# Patient Record
Sex: MALE | Race: WHITE | Employment: UNEMPLOYED | URBAN - METROPOLITAN AREA
[De-identification: names, ages, dates, MRNs, and addresses within clinical notes are randomized per-mention and may not be internally consistent; named-entity substitution may affect disease eponyms.]

---

## 2019-09-04 ENCOUNTER — TELEPHONE (OUTPATIENT)
Dept: NEUROLOGY | Age: 59
End: 2019-09-04

## 2019-09-11 ENCOUNTER — DOCUMENTATION ONLY (OUTPATIENT)
Dept: NEUROLOGY | Age: 59
End: 2019-09-11

## 2019-09-18 ENCOUNTER — TELEPHONE (OUTPATIENT)
Dept: NEUROLOGY | Age: 59
End: 2019-09-18

## 2019-09-18 NOTE — TELEPHONE ENCOUNTER
Re: Botox      Patient has [facility] to proceed due to Credentialing pending status with Umer Malone

## 2019-09-19 DIAGNOSIS — R25.2 SPASTICITY: Primary | ICD-10-CM

## 2019-09-26 ENCOUNTER — TELEPHONE (OUTPATIENT)
Dept: NEUROLOGY | Age: 59
End: 2019-09-26

## 2019-12-26 DIAGNOSIS — G80.2 SPASTIC HEMIPLEGIC CEREBRAL PALSY (HCC): ICD-10-CM

## 2019-12-26 DIAGNOSIS — G11.4 HEREDITARY SPASTIC PARAPLEGIA (HCC): Primary | ICD-10-CM

## 2019-12-27 ENCOUNTER — TELEPHONE (OUTPATIENT)
Dept: NEUROLOGY | Age: 59
End: 2019-12-27

## 2019-12-27 NOTE — TELEPHONE ENCOUNTER
Botox 300 units - sent to [facility] via [facility]. CPT's will not require a PA. SPP will be [facility]. Status is pending. Key: [id number] - PA Case ID: [id number] help?  Call us at [phone]  Status  Sent to [facility]  Drug  Botox 100UNIT solution  Form  FEP Electronic PA Form (NCPDP)

## 2019-12-27 NOTE — TELEPHONE ENCOUNTER
Botox approval from [address] letter, demo sheet, copy of ins card and Rx to [facility] att: Chay Lee, pharmacist to process and noted on cover sheet appt scheduled for 1/13/20 and would like to have delivered week of 1/6-1/10.

## 2019-12-30 ENCOUNTER — TELEPHONE (OUTPATIENT)
Dept: NEUROLOGY | Age: 59
End: 2019-12-30

## 2019-12-30 ENCOUNTER — DOCUMENTATION ONLY (OUTPATIENT)
Dept: NEUROLOGY | Age: 59
End: 2019-12-30

## 2019-12-30 NOTE — PROGRESS NOTES
Spoke to [facility] they are working botox and will call [facility] to confirm botox delivery.

## 2019-12-30 NOTE — TELEPHONE ENCOUNTER
Ian/pharmacist w/ [facility] called - they will ship out Botox on Mon, 1/6 for Tues delivery 1/7/20. They spoke w/ pt's caregiver and agreed with plan.

## 2020-01-09 ENCOUNTER — DOCUMENTATION ONLY (OUTPATIENT)
Dept: NEUROLOGY | Age: 60
End: 2020-01-09

## 2020-01-09 ENCOUNTER — TELEPHONE (OUTPATIENT)
Dept: NEUROLOGY | Age: 60
End: 2020-01-09

## 2020-01-09 NOTE — PROGRESS NOTES
botox came in the office: 1/7/2020  MFR: allergbrandon  LOT: [id number]  Exp: 5/2022  Appointment:1/13/2020  Specialty pharmacy:[facility]  Provider: Venecia Roman

## 2020-01-09 NOTE — TELEPHONE ENCOUNTER
No documentation if Botox was delivered. Called [facility] [address], pharmacist - was shipped overnight on 1/6 to be delivered on 1/7. Checked Botox log book - logged in by Jose Carlos flores on 1/7. No note in CC. Botox appt is Mon, 1/13/20.

## 2020-01-10 NOTE — PROGRESS NOTES
Neurology Note    Patient ID:  Raj Brandt  [id number]  68 y.o.  1960      Date of Consultation:  January 13, 2020    Referring Physician: Dr. Brigitte Lopez    Reason for Consultation:  Spasticity in legs    Subjective: I am here for botox       History of Present Illness:   Raj Brandt is a 61 y.o. male who was referred to the neurology clinic at [facility] for botulinum toxin injections. 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 He was previously receiving his botulinum toxin injections at [facility]. His injections were last given 3 months ago by Dr. Jose Valle. He continues to receive injections into his right lower extremity which helped significantly in regards to his spasticity, pain, and cramping in that extremity. He had no untoward side effects of the last injection. The only symptoms overall that he has noticed is that there is a slight increase in weakness in his left upper extremity but this is mild. He states that speech and swallowing are stable. Past Medical History:   Diagnosis Date    ALS (amyotrophic lateral sclerosis) ([facility])     right sided weakness    CP (cerebral palsy) ([facility])     cerebellar implants        Past Surgical History:   Procedure Laterality Date    HX HEENT      total teeth extraction/implants    HX ORTHOPAEDIC  2015    repair of fractured right hip    HX OTHER SURGICAL      cellebellar implants        Family History   Problem Relation Age of Onset    Heart Disease Mother     Dementia Father     Hypertension Sister     Hypertension Brother     Cancer Brother         lung    Hypertension Sister     Anesth Problems Neg Hx         Social History     Tobacco Use    Smoking status: Never Smoker    Smokeless tobacco: Never Used   Substance Use Topics    Alcohol use:  Yes     Alcohol/week: 2.0 standard drinks     Types: 2 Shots of liquor per week        No Known Allergies     Prior to Admission medications    Medication Sig Start Date End Date Taking? Authorizing Provider   onabotulinumtoxinA (BOTOX) 100 unit injection 300 Units by IntraMUSCular route every three (3) months for 90 days. 12/26/19 3/25/20 Yes Valentino Calderon DO   onabotulinumtoxinA (BOTOX) 100 unit injection 300 Units by IntraMUSCular route every three (3) months for 90 days. Indications: right lower extremity. 12/26/19 3/25/20 Yes Valentino Calderon DO   diazepam (VALIUM) 5 mg tablet Take 5 mg by mouth nightly. Yes Provider, Historical   naproxen sodium 220 mg cap Take 2 Tabs by mouth two (2) times a day. Yes Provider, Historical   oxyCODONE-acetaminophen (PERCOCET) 5-325 mg per tablet Take 1 Tab by mouth nightly.     Provider, Historical       Review of Systems:    General, constitutional: negative  Eyes, vision: negative  Ears, nose, throat: negative  Cardiovascular, heart: negative  Respiratory: negative  Gastrointestinal: negative  Genitourinary: negative  Musculoskeletal: Muscle pain and cramping  Skin and integumentary: negative  Psychiatric: negative  Endocrine: negative  Neurological: negative, except for HPI  Hematologic/lymphatic: negative  Allergy/immunology: negative      Objective:     Visit Vitals  BP (!) 135/93   Pulse (!) 55   Ht 5' 7\" (1.702 m)   SpO2 99%   BMI 21.93 kg/m²       Physical Exam:      General:  appears well nourished in no acute distress  Neck: no carotid bruits  Lungs: clear to auscultation  Heart:  no murmurs, regular rate  Lower extremity: peripheral pulses palpable and no edema  Skin: intact    Neurological exam:    Awake, alert, oriented to person, place and time  Recent and remote memory were normal  Speech is dysarthric which is his baseline    Cranial nerves:   II-XII were tested    Perrrla  Eomi, no evidence of nystagmus  Facial sensation:  normal and symmetric  Facial motor: normal and symmetric  Hearing intact  SCM strength intact  Tongue: midline without fasciculations    Motor:   Increased tone throughout  Trace fasciculations in his left lower extremity:    He does have mild diffuse weakness which is worse in his right upper and lower extremity. He has notable spasticity throughout and a greater degree of  weakness in his right hamstrings. Sensory:  Intact to pinprick throughout    Reflexes:  Diffuse hyperreflexia      Gait: Not assessed due to his spasticity and weakness    Labs:     No results found for: HBA1C, NA, K, CL, GLU, BUN, CREA, CA, WBC, HCT, HGB, PLT, LDL, [id number], HCTEXT, HGBEXT, PLTEXT, [id number], HCTEXT, HGBEXT, PLTEXT    Imaging:    No results found for this or any previous visit. No results found for this or any previous visit. 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 I plan to injection   300   units for the diagnosis of right lower extremity upper motor neuron spasticity    Consent performed and placed in medical records    Timeout was performed    All injections were performed under emg guidance unless otherwise indicated    Each vial of botulinum toxin was reconstituted with 2 cc of normal saline    Lot number:[id number] x 3    Expiration date: 5/22 x 3    Procedure:    EMG guidance was used for all injections unless otherwise noted. Right side:     Muscles Number of units Number of sites Total units injected   Biceps femoris LH 75  75   Biceps femoris SH 75  75   Semimembranosis 75  75   Semitendinosis 75  75           Amount of botulinum toxin injected: 300    Amount of wastage:0    Total amount of botulinum toxin:300 units    There was no immediate complications.     Blood loss was minimal    The patient was told to call the office or go to the nearest emergency room if side effects arise.     The patient will return in 3 months for re-evaluation and consideration of future injections               Signed By:  Lawyer Atul DO FAAN    January 13, 2020

## 2020-01-13 ENCOUNTER — OFFICE VISIT (OUTPATIENT)
Dept: NEUROLOGY | Age: 60
End: 2020-01-13

## 2020-01-13 ENCOUNTER — TELEPHONE (OUTPATIENT)
Dept: NEUROLOGY | Age: 60
End: 2020-01-13

## 2020-01-13 VITALS
OXYGEN SATURATION: 99 % | BODY MASS INDEX: 21.93 KG/M2 | HEIGHT: 67 IN | HEART RATE: 55 BPM | DIASTOLIC BLOOD PRESSURE: 93 MMHG | SYSTOLIC BLOOD PRESSURE: 135 MMHG

## 2020-01-13 DIAGNOSIS — R25.2 SPASTICITY: ICD-10-CM

## 2020-01-13 DIAGNOSIS — G12.29 MOTOR NEURON DISEASE, UPPER (HCC): ICD-10-CM

## 2020-01-13 NOTE — PATIENT INSTRUCTIONS
OnabotulinumtoxinA (Botox, Botox Cosmetic) - (By injection)   Why this medicine is used:   Treats muscle stiffness and spasms, excessive sweating, overactive bladder, or loss of bladder control. Prevents chronic migraine headaches. Improves the appearance of wrinkles on the face. Contact a nurse or doctor right away if you have:  · Slow or uneven heartbeat, chest pain, trouble breathing, swallowing, or talking  · Change in how much or how often you urinate, trouble or painful urination  · Headache, increased sweating, warmth or redness in your face, neck, or arm  · Blurred or double vision, droopy eyelids     Common side effects:  · Fever, chills, cough, stuffy or runny nose, sore throat, and body aches  · Pain in your neck, back, arms, or legs, muscle weakness  © 2017 [address] is for End User's use only and may not be sold, redistributed or otherwise used for commercial purposes.

## 2020-03-16 ENCOUNTER — TELEPHONE (OUTPATIENT)
Dept: NEUROLOGY | Age: 60
End: 2020-03-16

## 2020-03-16 NOTE — TELEPHONE ENCOUNTER
Botox still valid for 4/13/20 appt. Botox  Authorized by Aydin Bean Fed/Case ID: [id number] 11/27/19 -12/26/20. bb    Botox [id number] and [id number] No PA required from Aydin Bean / Yojana Amaya ph [phone] att: [facility], pharmacist (local)       Fax [phone] or [phone]

## 2020-03-25 ENCOUNTER — DOCUMENTATION ONLY (OUTPATIENT)
Dept: NEUROLOGY | Age: 60
End: 2020-03-25

## 2020-03-25 DIAGNOSIS — G80.2 SPASTIC HEMIPLEGIC CEREBRAL PALSY (HCC): ICD-10-CM

## 2020-03-25 DIAGNOSIS — G11.4 HEREDITARY SPASTIC PARAPLEGIA (HCC): ICD-10-CM

## 2020-03-25 NOTE — PROGRESS NOTES
Spoke to [employer] [facility] ) she will notify aslliance to ship botox

## 2020-03-25 NOTE — TELEPHONE ENCOUNTER
Dr. Martha Payne please refill botox   Last filled 12/26/19  This script had end date of 3/25/20    Please refill with no end date   thanks

## 2020-03-26 RX ORDER — ONABOTULINUMTOXINA 100 [USP'U]/1
300 INJECTION, POWDER, LYOPHILIZED, FOR SOLUTION INTRADERMAL; INTRAMUSCULAR
Qty: 300 UNITS | Refills: 3 | Status: SHIPPED | OUTPATIENT
Start: 2020-03-26 | End: 2021-01-19 | Stop reason: SDUPTHER

## 2020-04-02 ENCOUNTER — TELEPHONE (OUTPATIENT)
Dept: NEUROLOGY | Age: 60
End: 2020-04-02

## 2020-04-02 NOTE — TELEPHONE ENCOUNTER
[phone] - a female left a lengthy vm on my phone to confirm Botox appt on 4/13 and had other requests for nurse. Please call the above number for further details.

## 2020-04-03 NOTE — PROGRESS NOTES
Neurology Note    Patient ID:  Neto Zamudio  [id number]  81 y.o.  1960      Date of Consultation:  April 3, 2020    Referring Physician: Dr. Kiran Pedroza    Reason for Consultation:  spasticity    This is a telemedicine visit that was performed with in the originating site at patient's home and the distance site at [facility] outpatient clinic at [facility].  This telemedicine visit utilized synchronous (real-time) audio-video technology. Verbal consent to participate in the video visit was obtained. This visit occurred during the corona (COVID -19) public health emergency. I discussed with the patient the nature of our telemedicine visit, that:  - I would evaluate the patient and recommend diagnostic and treatment based on my assessment  - Our sessions are not being recorded and that personal health information is protected  - Our team will provide follow-up care in person if and when the patient needs it. Consent:  The patient is aware that this patient-initiated Telehealth encounter is a billable service, with coverage as determined by the patient's insurance carrier. The patient is aware that they may receive a bill and has provided verbal consent to proceed:     Subjective: I have spasticity and I need a new wheelchair       History of Present Illness:   Neto Zamudio is a 61 y.o. 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 He was last seen in the office in January 13, 2020. He received botulinum toxin at that visit. He is scheduled for his follow-up botulinum toxin injection next week. The primary reason for his visit today was an evaluation for a new motorized power wheelchair. He has had his current chair for an extended period of time and is having multiple pieces break and certain other parts are not functioning adequately. His footplates are now not functioning and his armrests are significantly warm. Given the length of time he has had his current power chair it is also not adequately fitted for him. He continues to have the need for a motorized power wheelchair due to the significant weakness and coordination difficulties associated with his cerebral palsy and his primary lateral sclerosis. Past Medical History:   Diagnosis Date    ALS [facility]amyotrophic lateral sclerosis) ([facility])     right sided weakness    CP (cerebral palsy) ([facility])     cerebellar implants        Past Surgical History:   Procedure Laterality Date    HX HEENT      total teeth extraction/implants    HX ORTHOPAEDIC  2015    repair of fractured right hip    HX OTHER SURGICAL      cellebellar implants        Family History   Problem Relation Age of Onset    Heart Disease Mother     Dementia Father     Hypertension Sister     Hypertension Brother     Cancer Brother         lung    Hypertension Sister     Anesth Problems Neg Hx         Social History     Tobacco Use    Smoking status: Never Smoker    Smokeless tobacco: Never Used   Substance Use Topics    Alcohol use: Yes     Alcohol/week: 2.0 standard drinks     Types: 2 Shots of liquor per week        No Known Allergies     Prior to Admission medications    Medication Sig Start Date End Date Taking? Authorizing Provider   onabotulinumtoxinA (Botox) 100 unit injection 300 Units by IntraMUSCular route every three (3) months. Indications: right lower extremity. 3/26/20   Valentino Calderon, DO   diazepam (VALIUM) 5 mg tablet Take 5 mg by mouth nightly. Provider, Historical   oxyCODONE-acetaminophen (PERCOCET) 5-325 mg per tablet Take 1 Tab by mouth nightly. Provider, Historical   naproxen sodium 220 mg cap Take 2 Tabs by mouth two (2) times a day.     Provider, Historical       Review of Systems:    General, constitutional: negative  Eyes, vision: negative  Ears, nose, throat: negative  Cardiovascular, heart: negative  Respiratory: negative  Gastrointestinal: negative  Genitourinary: negative  Musculoskeletal: negative  Skin and integumentary: negative  Psychiatric: negative  Endocrine: negative  Neurological: negative, except for HPI  Hematologic/lymphatic: negative  Allergy/immunology: negative      Objective: There were no vitals taken for this visit. No vital signs were obtained via telemedicine today. There are limitations to the neurological examination due to the technological features of telemedicine  Physical Exam:      General:  appears well nourished in no acute distress  Skin: intact  Respiratory: No labored breathing  He was examined while seated in his chair    Neurological exam:    Awake, alert, oriented to person, place and time  Recent and remote memory were normal  Attention and concentration were intact  Language was intact. There was no aphasia  He does have a dysarthric speech. Fund of knowledge was preserved. Cranial nerves:     Visual fields were full  Eomi, no evidence of nystagmus  Facial motor: normal and symmetric  Hearing intact    Tongue: midline without fasciculations    Motor:     No evidence of fasciculations  He does have spasticity and rigidity which is noted via telemedicine. Strength testing:  He does have diffuse weakness that is more pronounced in his right upper and his lower extremity. He is a 1 out of 5 strength in his right lower extremity. He is a 4 out of 5 strength in his left upper and lower extremity    Sensory:    Reflexes:  Unable to obtain via telemedicine    Cerebellar testing: There was no tremor apparent.   He does have the spasticity and dystonia which impacts his freedom of range of motion    Gait was not assessed due to his weakness and concern over safety      Labs:     No results found for: HBA1C, NA, K, CL, GLU, BUN, CREA, CA, WBC, HCT, HGB, PLT, LDL, [id number], HCTEXT, HGBEXT, PLTEXT    Imaging:    No results found for this or any previous visit. No results found for this or any previous visit. Assessment and Plan:    The patient is a pleasant 63-year-old gentleman with a history of cerebral palsy and primary lateral sclerosis who presents for neurological evaluation due to concerns of a ill fitting motorized power wheelchair and need for a new chair. Cerebral palsy and primary lateral sclerosis:  I do have concerns about his safety in he needs a new motorized power wheelchair to help with movement and to help with his activities of daily living. The chair that he currently has is outdated with broken parts and an adequately fitted for him. I will write him a prescription for a new chair and physical therapy evaluation for fitting for the chair. Upper motor neuron spasticity:  He will continue with botulinum toxin injections and does have this scheduled for next week. There is no problem list on file for this patient.               The patient should return to clinic for chemodenervation    Renewed medication: none, scripts written for chair and pt evaluation      Signed By:  Kirstie Messer DO FAAN    April 3, 2020

## 2020-04-06 ENCOUNTER — TELEPHONE (OUTPATIENT)
Dept: NEUROLOGY | Age: 60
End: 2020-04-06

## 2020-04-06 ENCOUNTER — VIRTUAL VISIT (OUTPATIENT)
Dept: NEUROLOGY | Age: 60
End: 2020-04-06

## 2020-04-06 DIAGNOSIS — G80.0 SPASTIC QUADRIPLEGIC CEREBRAL PALSY (HCC): Primary | ICD-10-CM

## 2020-04-06 DIAGNOSIS — G12.23 PRIMARY LATERAL SCLEROSIS (HCC): ICD-10-CM

## 2020-04-06 RX ORDER — LOPERAMIDE HCL 2 MG
2 TABLET ORAL 2 TIMES DAILY
COMMUNITY

## 2020-04-06 RX ORDER — IBUPROFEN 400 MG/1
TABLET ORAL 2 TIMES DAILY
COMMUNITY

## 2020-04-06 NOTE — PATIENT INSTRUCTIONS
A Healthy Lifestyle: Care Instructions Your Care Instructions A healthy lifestyle can help you feel good, stay at a healthy weight, and have plenty of energy for both work and play. A healthy lifestyle is something you can share with your whole family. A healthy lifestyle also can lower your risk for serious health problems, such as high blood pressure, heart disease, and diabetes. You can follow a few steps listed below to improve your health and the health of your family. Follow-up care is a key part of your treatment and safety. Be sure to make and go to all appointments, and call your doctor if you are having problems. It's also a good idea to know your test results and keep a list of the medicines you take. How can you care for yourself at home? · Do not eat too much sugar, fat, or fast foods. You can still have dessert and treats now and then. The goal is moderation. · Start small to improve your eating habits. Pay attention to portion sizes, drink less juice and soda pop, and eat more fruits and vegetables. ? Eat a healthy amount of food. A 3-ounce serving of meat, for example, is about the size of a deck of cards. Fill the rest of your plate with vegetables and whole grains. ? Limit the amount of soda and sports drinks you have every day. Drink more water when you are thirsty. ? Eat at least 5 servings of fruits and vegetables every day. It may seem like a lot, but it is not hard to reach this goal. A serving or helping is 1 piece of fruit, 1 cup of vegetables, or 2 cups of leafy, raw vegetables. Have an apple or some carrot sticks as an afternoon snack instead of a candy bar. Try to have fruits and/or vegetables at every meal. 
· Make exercise part of your daily routine. You may want to start with simple activities, such as walking, bicycling, or slow swimming. Try to be active 30 to 60 minutes every day.  You do not need to do all 30 to 60 minutes all at once. For example, you can exercise 3 times a day for 10 or 20 minutes. Moderate exercise is safe for most people, but it is always a good idea to talk to your doctor before starting an exercise program. 
· Keep moving. Minerva Nap the lawn, work in the garden, or [employer]. Take the stairs instead of the elevator at work. · If you smoke, quit. People who smoke have an increased risk for heart attack, stroke, cancer, and other lung illnesses. Quitting is hard, but there are ways to boost your chance of quitting tobacco for good. ? Use nicotine gum, patches, or lozenges. ? Ask your doctor about stop-smoking programs and medicines. ? Keep trying. In addition to reducing your risk of diseases in the future, you will notice some benefits soon after you stop using tobacco. If you have shortness of breath or asthma symptoms, they will likely get better within a few weeks after you quit. · Limit how much alcohol you drink. Moderate amounts of alcohol (up to 2 drinks a day for men, 1 drink a day for women) are okay. But drinking too much can lead to liver problems, high blood pressure, and other health problems. Family health If you have a family, there are many things you can do together to improve your health. · Eat meals together as a family as often as possible. · Eat healthy foods. This includes fruits, vegetables, lean meats and dairy, and whole grains. · Include your family in your fitness plan. Most people think of activities such as jogging or tennis as the way to fitness, but there are many ways you and your family can be more active. Anything that makes you breathe hard and gets your heart pumping is exercise. Here are some tips: 
? Walk to do errands or to take your child to school or the bus. 
? Go for a family bike ride after dinner instead of watching TV. Where can you learn more? Go to http://meek-sujit.info/ Enter C769 in the search box to learn more about \"A Healthy Lifestyle: Care Instructions. \" Current as of: May 28, 2019Content Version: 12.4 © 1789-1341 [employer]wise[employer]. Care instructions adapted under license by [employer] (which disclaims liability or warranty for this information). If you have questions about a medical condition or this instruction, always ask your healthcare professional. [place] [address] any warranty or liability for your use of this information.

## 2020-04-06 NOTE — TELEPHONE ENCOUNTER
Pt's wife - stated she has already s/w [place] and given consent and they will bill her for the copay. Confirmed appt for 4/13 at 11am.     I will notify Ian[facility] of this as he stated they were trying to reach pt and waiting for call back.

## 2020-04-07 ENCOUNTER — DOCUMENTATION ONLY (OUTPATIENT)
Dept: NEUROLOGY | Age: 60
End: 2020-04-07

## 2020-04-10 ENCOUNTER — TELEPHONE (OUTPATIENT)
Dept: NEUROLOGY | Age: 60
End: 2020-04-10

## 2020-04-10 NOTE — TELEPHONE ENCOUNTER
Botox (3)  100 unit vials scheduled for delivery on tues 4/14/20    [phone] for status check - [place].      Called pt's wife - advised of r/s appt for Wed 4/15 at 11:40 a.m

## 2020-04-14 ENCOUNTER — DOCUMENTATION ONLY (OUTPATIENT)
Dept: NEUROLOGY | Age: 60
End: 2020-04-14

## 2020-04-14 NOTE — PROGRESS NOTES
botox came in the office: 4/14/2020  MFR: damon  LOT: [id number]  Exp: 9/2022  Appointment:7/27/2020  Specialty pharmacy:[facility]  Provider: Татьяна Winter

## 2020-04-14 NOTE — PROGRESS NOTES
Neurology Note    Patient ID:  Josselin Champagne  [id number]  64 y.o.  1960      Date of Consultation:  April 14, 2020    Referring Physician: Dr. Dany Gilbert    Reason for Consultation:  Spasticity in legs    Subjective: I am here for botox       History of Present Illness:   Josselin Champagne is a 61 y.o. male who was referred to the neurology clinic at [facility] for botulinum toxin injections. 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 He was previously receiving his botulinum toxin injections at [facility]. I did first see him at [facility] on January 13, 2020. He received his botulinum toxin injections at that time. Please see my history of present illness, examination, and treatment based plan from that day. He tolerated those injections well and were without complications. He still does have significant spasticity, pain, and cramping in that right lower extremity. He is beginning to notice some intermittent cramping and spasms in his proximal left lower extremity. He is just begun to notice this over the last week. He is continued to slowly notice progression of all of his limbs in regards to mild weakness. He is working on getting his new motorized wheelchair approved.       Past Medical History:   Diagnosis Date    ALS (amyotrophic lateral sclerosis) ([facility])     right sided weakness    CP (cerebral palsy) ([facility])     cerebellar implants        Past Surgical History:   Procedure Laterality Date    HX HEENT      total teeth extraction/implants    HX ORTHOPAEDIC  2015    repair of fractured right hip    HX OTHER SURGICAL      cellebellar implants        Family History   Problem Relation Age of Onset    Heart Disease Mother     Dementia Father     Hypertension Sister     Hypertension Brother     Cancer Brother         lung    Hypertension Sister    [facility] Problems Neg Hx         Social History     Tobacco Use    Smoking status: Never Smoker    Smokeless tobacco: Never Used   Substance Use Topics    Alcohol use: Yes     Alcohol/week: 2.0 standard drinks     Types: 2 Shots of liquor per week        No Known Allergies     Prior to Admission medications    Medication Sig Start Date End Date Taking? Authorizing Provider   ibuprofen (MOTRIN) 400 mg tablet Take  by mouth two (2) times a day. Provider, Historical   loperamide (IMMODIUM) 2 mg tablet Take 2 mg by mouth two (2) times a day. Provider, Historical   OTHER Motorized powerwheelchair 4/6/20   Valentino Calderon, DO   OTHER Physical therapy evaluation for power wheel chair. 4/6/20   Valentino Calderon, DO   onabotulinumtoxinA (Botox) 100 unit injection 300 Units by IntraMUSCular route every three (3) months. Indications: right lower extremity. 3/26/20   Valentino Calderon, DO   diazepam (VALIUM) 5 mg tablet Take 5 mg by mouth nightly. Provider, Historical   oxyCODONE-acetaminophen (PERCOCET) 5-325 mg per tablet Take 1 Tab by mouth nightly. Provider, Historical   naproxen sodium 220 mg cap Take 2 Tabs by mouth two (2) times a day. Provider, Historical       Review of Systems:    General, constitutional: negative  Eyes, vision: negative  Ears, nose, throat: negative  Cardiovascular, heart: negative  Respiratory: negative  Gastrointestinal: negative  Genitourinary: negative  Musculoskeletal: Muscle pain and cramping  Skin and integumentary: negative  Psychiatric: negative  Endocrine: negative  Neurological: negative, except for HPI  Hematologic/lymphatic: negative  Allergy/immunology: negative      Objective: There were no vitals taken for this visit.     Physical Exam:      General:  appears well nourished in no acute distress  Neck: no carotid bruits  Lungs: clear to auscultation  Heart:  no murmurs, regular rate  Lower extremity: peripheral pulses palpable and no edema  Skin: intact    Neurological exam:    Awake, alert, oriented to person, place and time  Recent and remote memory were normal  Speech is dysarthric which is his baseline    Cranial nerves:   II-XII were tested    Perrrla  Eomi, no evidence of nystagmus  Facial sensation:  normal and symmetric  Facial motor: normal and symmetric  Hearing intact  SCM strength intact  Tongue: midline without fasciculations    Motor:   Increased tone throughout  Trace fasciculations in his left lower extremity:    He does have mild diffuse weakness which is worse in his right upper and lower extremity. He has notable spasticity throughout and a greater degree of  weakness in his right hamstrings. Sensory:  Intact to pinprick throughout    Reflexes:  Diffuse hyperreflexia      Gait: Not assessed due to his spasticity and weakness    Labs:     No results found for: HBA1C, NA, K, CL, GLU, BUN, CREA, CA, WBC, HCT, HGB, PLT, LDL, COL6UTAH, HCTEXT, HGBEXT, PLTEXT, [id number], HCTEXT, HGBEXT, PLTEXT    Imaging:    No results found for this or any previous visit. No results found for this or any previous visit. 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 I plan to injection   300   units for the diagnosis of right lower extremity upper motor neuron spasticity    Consent performed and placed in medical records    Timeout was performed    All injections were performed under emg guidance unless otherwise indicated    Each vial of botulinum toxin was reconstituted with 2 cc of normal saline    Lot number:[id number] x 3    Expiration date: 9/22 x 3    Procedure:    EMG guidance was used for all injections unless otherwise noted.      Right side:     Muscles Number of units Number of sites Total units injected   Biceps femoris LH 75  75   Biceps femoris SH 75  75   Semimembranosis 75  75   Semitendinosis 75  75           Amount of botulinum toxin injected: 300    Amount of wastage:0    Total amount of botulinum toxin:300 units    There was no immediate complications. Blood loss was minimal    The patient was told to call the office or go to the nearest emergency room if side effects arise.     The patient will return in 3 months for re-evaluation and consideration of future injections               Signed By:  Demetrius Corrales DO FAAN    April 14, 2020

## 2020-04-15 ENCOUNTER — OFFICE VISIT (OUTPATIENT)
Dept: NEUROLOGY | Age: 60
End: 2020-04-15

## 2020-04-15 VITALS — DIASTOLIC BLOOD PRESSURE: 88 MMHG | HEART RATE: 67 BPM | OXYGEN SATURATION: 99 % | SYSTOLIC BLOOD PRESSURE: 144 MMHG

## 2020-04-15 DIAGNOSIS — R25.2 SPASTICITY: ICD-10-CM

## 2020-05-19 ENCOUNTER — TELEPHONE (OUTPATIENT)
Dept: NEUROLOGY | Age: 60
End: 2020-05-19

## 2020-05-19 DIAGNOSIS — G80.0 SPASTIC QUADRIPLEGIC CEREBRAL PALSY (HCC): ICD-10-CM

## 2020-05-19 DIAGNOSIS — G12.23 PRIMARY LATERAL SCLEROSIS (HCC): ICD-10-CM

## 2020-05-19 NOTE — TELEPHONE ENCOUNTER
Prescription and paperwork for complex power chair faxed to [address] [place] [address] and confirmation was received ans placed in scan bin.

## 2020-06-05 ENCOUNTER — TELEPHONE (OUTPATIENT)
Dept: NEUROLOGY | Age: 60
End: 2020-06-05

## 2020-06-30 ENCOUNTER — TELEPHONE (OUTPATIENT)
Dept: NEUROLOGY | Age: 60
End: 2020-06-30

## 2020-06-30 NOTE — TELEPHONE ENCOUNTER
Re: Botox order    Called [facility] - will set up for delivery on 7/16/20. They have refills on the Rx. Appt is 7/27. [facility] will call me to verify del. Address.

## 2020-07-16 ENCOUNTER — TELEPHONE (OUTPATIENT)
Dept: NEUROLOGY | Age: 60
End: 2020-07-16

## 2020-07-16 NOTE — TELEPHONE ENCOUNTER
[facility] just to let you know ,   Spoke to [facility] and botox has to go through [employer]   Mat with [facility] will call [facility] and ask them to call back to have botox delivered   They will call Santhosh Carballo to schedule delivery

## 2020-07-21 ENCOUNTER — TELEPHONE (OUTPATIENT)
Dept: NEUROLOGY | Age: 60
End: 2020-07-21

## 2020-07-22 ENCOUNTER — DOCUMENTATION ONLY (OUTPATIENT)
Dept: NEUROLOGY | Age: 60
End: 2020-07-22

## 2020-07-24 NOTE — PROGRESS NOTES
Neurology Note    Patient ID:  Khari Paula  [id number]  34 y.o.  1960      Date of Consultation:  July 27, 2020    Referring Physician: Dr. Bridgette Hayes    Reason for Consultation:  Spasticity in legs    Subjective: I am here for botox       History of Present Illness:   Khari Paula is a 61 y.o. male who returns to the neurology clinic at [facility] for his botulinum toxin injections. I did last see the patient in clinic on April 15, 2020. 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 He is continued to slowly notice progression of all of his limbs in regards to mild weakness. He has received his new motorized wheelchair since his last visit. He has the chair but not the seat cushion. He has went back to using his old seat cushion. The only new symptom that he has noticed is that his fingers on the right hand are getting more chronic. He has noticed that the Botox has worn off over the last 2 weeks and he is noticing increasing pain and stiffness in that right lower extremity      Past Medical History:   Diagnosis Date    ALS (amyotrophic lateral sclerosis) ([address]     right sided weakness    CP (cerebral palsy) ([address]     cerebellar implants        Past Surgical History:   Procedure Laterality Date    HX HEENT      total teeth extraction/implants    HX ORTHOPAEDIC  2015    repair of fractured right hip    HX OTHER SURGICAL      cellebellar implants        Family History   Problem Relation Age of Onset    Heart Disease Mother     Dementia Father     Hypertension Sister     Hypertension Brother     Cancer Brother         lung    Hypertension Sister     Anesth Problems Neg Hx         Social History     Tobacco Use    Smoking status: Never Smoker    Smokeless tobacco: Never Used   Substance Use Topics    Alcohol use:  Yes     Alcohol/week: 2.0 standard drinks     Types: 2 Shots of liquor per week        No Known Allergies     Prior to Admission medications    Medication Sig Start Date End Date Taking? Authorizing Provider   OTHER Motorized powerwheelchair 6/19/20  Yes Valentino Calderon, DO   ibuprofen (MOTRIN) 400 mg tablet Take  by mouth two (2) times a day. Yes Provider, Historical   loperamide (IMMODIUM) 2 mg tablet Take 2 mg by mouth two (2) times a day. Yes Provider, Historical   onabotulinumtoxinA (Botox) 100 unit injection 300 Units by IntraMUSCular route every three (3) months. Indications: right lower extremity. 3/26/20  Yes Valentino Calderon, DO   diazepam (VALIUM) 5 mg tablet Take 5 mg by mouth nightly. Yes Provider, Historical   naproxen sodium 220 mg cap Take 2 Tabs by mouth two (2) times a day. Yes Provider, Historical   OTHER Physical therapy evaluation for power wheel chair. 4/6/20   Valentino Calderon, DO   oxyCODONE-acetaminophen (PERCOCET) 5-325 mg per tablet Take 1 Tab by mouth nightly.     Provider, Historical       Review of Systems:    General, constitutional: negative  Eyes, vision: negative  Ears, nose, throat: negative  Cardiovascular, heart: negative  Respiratory: negative  Gastrointestinal: negative  Genitourinary: negative  Musculoskeletal: Muscle pain and cramping  Skin and integumentary: negative  Psychiatric: negative  Endocrine: negative  Neurological: negative, except for HPI  Hematologic/lymphatic: negative  Allergy/immunology: negative      Objective:     Visit Vitals  /80   Pulse 74   Temp 97.4 °F (36.3 °C)   SpO2 97%       Physical Exam:    General:  appears well nourished in no acute distress  Neck: no carotid bruits  Lungs: clear to auscultation  Heart:  no murmurs, regular rate  Lower extremity: peripheral pulses palpable and no edema  Skin: intact    Neurological exam:    Awake, alert, oriented to person, place and time  Recent and remote memory were normal  Speech is dysarthric which is his baseline    Cranial nerves:   II-XII were tested    Perrrla  Eomi, no evidence of nystagmus  Facial sensation:  normal and symmetric  Facial motor: normal and symmetric  Hearing intact  SCM strength intact  Tongue: midline without fasciculations    Motor:   Increased tone throughout  Trace fasciculations in his left lower extremity:    He does have mild diffuse weakness which is worse in his right upper and lower extremity. He has notable spasticity throughout and a greater degree of  weakness in his right hamstrings. Sensory:  Intact to pinprick throughout    Reflexes:  Diffuse hyperreflexia      Gait: Not assessed due to his spasticity and weakness    Labs:     No results found for: HBA1C, NA, K, CL, GLU, BUN, CREA, CA, WBC, HCT, HGB, PLT, LDL, ANA2HVXT, HCTEXT, HGBEXT, PLTEXT, LBO4WQMR, HCTEXT, HGBEXT, PLTEXT    Imaging:    No results found for this or any previous visit. No results found for this or any previous visit. 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 I plan to injection   300   units for the diagnosis of right lower extremity upper motor neuron spasticity    Consent performed and placed in medical records    Timeout was performed    All injections were performed under emg guidance unless otherwise indicated    Each vial of botulinum toxin was reconstituted with 2 cc of normal saline    Lot number:[id number] x 3    Expiration date: 2/23 x 3    Procedure:    EMG guidance was used for all injections unless otherwise noted.      Right side:     Muscles Number of units Number of sites Total units injected   Biceps femoris LH 75  75   Biceps femoris SH 75  75   Semimembranosis 75  75   Semitendinosis 75  75           Amount of botulinum toxin injected: 300    Amount of wastage:0    Total amount of botulinum toxin:300 units    There was no immediate complications. Blood loss was minimal    The patient was told to call the office or go to the nearest emergency room if side effects arise.     The patient will return in 3 months for re-evaluation and consideration of future injections               Signed By:  Adolfo Singh DO FAAN    July 27, 2020

## 2020-07-27 ENCOUNTER — OFFICE VISIT (OUTPATIENT)
Dept: NEUROLOGY | Age: 60
End: 2020-07-27

## 2020-07-27 VITALS
TEMPERATURE: 97.4 F | DIASTOLIC BLOOD PRESSURE: 80 MMHG | SYSTOLIC BLOOD PRESSURE: 130 MMHG | HEART RATE: 74 BPM | OXYGEN SATURATION: 97 %

## 2020-07-27 DIAGNOSIS — G80.2 SPASTIC HEMIPLEGIC CEREBRAL PALSY (HCC): ICD-10-CM

## 2020-07-27 DIAGNOSIS — G12.29 UPPER MOTOR NEURON DISEASE (HCC): ICD-10-CM

## 2020-10-15 ENCOUNTER — TELEPHONE (OUTPATIENT)
Dept: NEUROLOGY | Age: 60
End: 2020-10-15

## 2020-10-15 NOTE — PROGRESS NOTES
Neurology Note    Patient ID:  Nakia Myers  [id number]  31 y.o.  1960      Date of Consultation:  October 19, 2020    Referring Physician: Dr. Manny Barboza    Reason for Consultation:  Spasticity in legs    Subjective: I am here for botox       History of Present Illness:   Nakia Myers is a 61 y.o. male who returns to the neurology clinic at [facility] for his botulinum toxin injections. The patient was last seen in clinic on July 27, 2020. Please see my history of present illness, examination, and treatment base plan from that day. 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 Since he was last here, feel that the botulinum toxin helped him considerably. It has been only over the last few days to week that he has noticed increased spasticity in that leg. He is also noticed more stiffness in his right ankle and some tingling in sensory disturbance there. He is also noticing increasing back pain. He is trying to stretch that out but having considerable symptoms. His sister does need to massage and roll his ankle at night to provide some relief.     He would like to receive his botulinum toxin today      Past Medical History:   Diagnosis Date    ALS (amyotrophic lateral sclerosis) ([address]     right sided weakness    CP (cerebral palsy) ([facility])     cerebellar implants        Past Surgical History:   Procedure Laterality Date    HX HEENT      total teeth extraction/implants    HX ORTHOPAEDIC  2015    repair of fractured right hip    HX OTHER SURGICAL      cellebellar implants        Family History   Problem Relation Age of Onset    Heart Disease Mother     Dementia Father     Hypertension Sister     Hypertension Brother     Cancer Brother         lung    Hypertension Sister     Anesth Problems Neg Hx         Social History     Tobacco Use    Smoking status: Never Smoker    Smokeless tobacco: Never Used   Substance Use Topics    Alcohol use: Yes     Alcohol/week: 2.0 standard drinks     Types: 2 Shots of liquor per week        No Known Allergies     Prior to Admission medications    Medication Sig Start Date End Date Taking? Authorizing Provider   OTHER Motorized powerwheelchair 6/19/20  Yes Valentino Calderon, DO   ibuprofen (MOTRIN) 400 mg tablet Take  by mouth two (2) times a day. Yes Provider, Historical   loperamide (IMMODIUM) 2 mg tablet Take 2 mg by mouth two (2) times a day. Yes Provider, Historical   OTHER Physical therapy evaluation for power wheel chair. 4/6/20  Yes Valentino Calderon, DO   onabotulinumtoxinA (Botox) 100 unit injection 300 Units by IntraMUSCular route every three (3) months. Indications: right lower extremity. 3/26/20  Yes Valentino Calderon, DO   diazepam (VALIUM) 5 mg tablet Take 5 mg by mouth nightly. Yes Provider, Historical   oxyCODONE-acetaminophen (PERCOCET) 5-325 mg per tablet Take 1 Tab by mouth nightly. Yes Provider, Historical   naproxen sodium 220 mg cap Take 2 Tabs by mouth two (2) times a day.    Yes Provider, Historical       Review of Systems:    General, constitutional: negative  Eyes, vision: negative  Ears, nose, throat: negative  Cardiovascular, heart: negative  Respiratory: negative  Gastrointestinal: negative  Genitourinary: negative  Musculoskeletal: Muscle pain and cramping  Skin and integumentary: negative  Psychiatric: negative  Endocrine: negative  Neurological: negative, except for HPI  Hematologic/lymphatic: negative  Allergy/immunology: negative      Objective:     Visit Vitals  /70 (BP 1 Location: Left arm, BP Patient Position: At rest)   Pulse 80   Temp 97.5 °F (36.4 °C) (Oral)   Resp 18   Ht 5' 7\" (1.702 m)   Wt 139 lb (63 kg)   SpO2 98%   BMI 21.77 kg/m²       Physical Exam:    General:  appears well nourished in no acute distress  Neck: no carotid bruits  Lungs: clear to auscultation  Heart:  no murmurs, regular rate  Lower extremity: peripheral pulses palpable and no edema  Skin: intact    Neurological exam:    Awake, alert, oriented to person, place and time  Recent and remote memory were normal  Speech is dysarthric which is his baseline    Cranial nerves:   II-XII were tested    Perrrla  Eomi, no evidence of nystagmus  Facial sensation:  normal and symmetric  Facial motor: normal and symmetric  Hearing intact  SCM strength intact  Tongue: midline without fasciculations    Motor:   Increased tone throughout  Trace fasciculations in his left lower extremity:    He does have mild diffuse weakness which is worse in his right upper and lower extremity. He has notable spasticity throughout and a greater degree of  weakness in his right hamstrings. Sensory:  Intact to pinprick throughout    Reflexes:  Diffuse hyperreflexia      Gait: Not assessed due to his spasticity and weakness    Labs:     No results found for: HBA1C, NA, K, CL, GLU, BUN, CREA, CA, WBC, HCT, HGB, PLT, LDL, DES8USIS, HCTEXT, HGBEXT, PLTEXT, RUN0DNHF, HCTEXT, HGBEXT, PLTEXT    Imaging:    No results found for this or any previous visit. No results found for this or any previous visit. 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 I plan to injection   300   units for the diagnosis of right lower extremity upper motor neuron spasticity    Consent performed and placed in medical records    Timeout was performed    All injections were performed under emg guidance unless otherwise indicated    Each vial of botulinum toxin was reconstituted with 2 cc of normal saline    Lot number:[id number], [id number]    Expiration date:11/21, 11/21    Procedure:    EMG guidance was used for all injections unless otherwise noted.      Right side:     Muscles Number of units Number of sites Total units injected   Biceps femoris LH 75 1 75   Biceps femoris SH 75 1 75   Semimembranosis 75 1 75   Semitendinosis 75 1 75           Amount of botulinum toxin injected: 300    Amount of wastage:0    Total amount of botulinum toxin:300 units    There was no immediate complications. Blood loss was minimal    The patient was told to call the office or go to the nearest emergency room if side effects arise. The patient will return in 3 months for re-evaluation and consideration of future injections    I did also recommend that he use anti-inflammatory lotion on his ankle and continue to work on range of motion activities and changing of positions frequently.              Signed By:  Gee Melgar DO FAAN    October 19, 2020

## 2020-10-16 ENCOUNTER — TELEPHONE (OUTPATIENT)
Dept: NEUROLOGY | Age: 60
End: 2020-10-16

## 2020-10-19 ENCOUNTER — OFFICE VISIT (OUTPATIENT)
Dept: NEUROLOGY | Age: 60
End: 2020-10-19
Payer: COMMERCIAL

## 2020-10-19 VITALS
BODY MASS INDEX: 21.82 KG/M2 | HEART RATE: 80 BPM | HEIGHT: 67 IN | OXYGEN SATURATION: 98 % | WEIGHT: 139 LBS | TEMPERATURE: 97.5 F | RESPIRATION RATE: 18 BRPM | SYSTOLIC BLOOD PRESSURE: 126 MMHG | DIASTOLIC BLOOD PRESSURE: 70 MMHG

## 2020-10-19 DIAGNOSIS — R25.2 SPASTICITY: ICD-10-CM

## 2020-10-19 DIAGNOSIS — G80.2 SPASTIC HEMIPLEGIC CEREBRAL PALSY (HCC): ICD-10-CM

## 2020-10-19 PROCEDURE — 64642 CHEMODENERV 1 EXTREMITY 1-4: CPT | Performed by: PSYCHIATRY & NEUROLOGY

## 2020-10-19 NOTE — TELEPHONE ENCOUNTER
Botox 3 100units arrived  [address]   [phone]  Refills none left    Patient had to use samples as his Botox did not arrive until after his appt.  He has 3 100 units in the refrigerator 10/19/20

## 2020-11-25 ENCOUNTER — TELEPHONE (OUTPATIENT)
Dept: NEUROLOGY | Age: 60
End: 2020-11-25

## 2020-11-25 NOTE — TELEPHONE ENCOUNTER
----- Message from [facility] sent at 11/25/2020  9:23 AM EST -----  Regarding: Dr. Johana Cruz Message/Vendor Calls    Caller's first and last name:  Kuldeep Valero (Sister)      Reason for call:  Prior authorization/verification for Botox      Callback required yes/no and why: Y      Best contact number(s): [phone]      Details to clarify the request:  Ms. Gee Méndez sent two [employer] messages regarding confirming the verification for Botox with [employer], but she hasn't heard back yet. Please contact Ms. Gee Méndez through [address] or phone after verifying the Botox prior approval.      [facility]

## 2020-12-01 ENCOUNTER — TELEPHONE (OUTPATIENT)
Dept: NEUROLOGY | Age: 60
End: 2020-12-01

## 2020-12-02 NOTE — TELEPHONE ENCOUNTER
Sent message through My Chart to pt's sister, Ms. Swartz Radhika:     Darell Ms. Carla Lucero next appointment for Botox is 1/25/21. I will process this renewal early January. As long as her insurance remains the same with no formulary changes with [employer], it should approve easily. If there are any issues with obtaining it, I will certainly let you know.      Thank you,   Logan Brizuela,   Insurance Authorization Specialist.

## 2021-01-01 ENCOUNTER — TELEPHONE (OUTPATIENT)
Dept: NEUROLOGY | Age: 61
End: 2021-01-01

## 2021-01-01 ENCOUNTER — OFFICE VISIT (OUTPATIENT)
Dept: NEUROLOGY | Age: 61
End: 2021-01-01
Payer: COMMERCIAL

## 2021-01-01 DIAGNOSIS — G81.10 SPASTIC HEMIPLEGIA AFFECTING UNSPECIFIED SIDE: ICD-10-CM

## 2021-01-01 DIAGNOSIS — G11.4 HEREDITARY SPASTIC PARAPLEGIA (HCC): ICD-10-CM

## 2021-01-01 DIAGNOSIS — G81.11 SPASTIC HEMIPLEGIA OF RIGHT DOMINANT SIDE DUE TO NONCEREBROVASCULAR ETIOLOGY (HCC): Primary | ICD-10-CM

## 2021-01-01 DIAGNOSIS — G81.10 SPASTIC HEMIPLEGIA AFFECTING DOMINANT SIDE (HCC): ICD-10-CM

## 2021-01-01 DIAGNOSIS — G81.10 SPASTIC HEMIPLEGIA AFFECTING DOMINANT SIDE (HCC): Primary | ICD-10-CM

## 2021-01-01 DIAGNOSIS — G80.2 SPASTIC HEMIPLEGIC CEREBRAL PALSY (HCC): ICD-10-CM

## 2021-01-01 DIAGNOSIS — G12.29 UPPER MOTOR NEURON DISEASE (HCC): ICD-10-CM

## 2021-01-01 PROCEDURE — 95874 GUIDE NERV DESTR NEEDLE EMG: CPT | Performed by: PSYCHIATRY & NEUROLOGY

## 2021-01-01 PROCEDURE — 64642 CHEMODENERV 1 EXTREMITY 1-4: CPT | Performed by: PSYCHIATRY & NEUROLOGY

## 2021-01-01 RX ORDER — ONABOTULINUMTOXINA 100 [USP'U]/1
300 INJECTION, POWDER, LYOPHILIZED, FOR SOLUTION INTRADERMAL; INTRAMUSCULAR
Qty: 3 EACH | Refills: 3 | Status: SHIPPED | OUTPATIENT
Start: 2021-01-01 | End: 2022-01-01 | Stop reason: SDUPTHER

## 2021-01-19 ENCOUNTER — TELEPHONE (OUTPATIENT)
Dept: NEUROLOGY | Age: 61
End: 2021-01-19

## 2021-01-19 DIAGNOSIS — G80.2 SPASTIC HEMIPLEGIC CEREBRAL PALSY (HCC): ICD-10-CM

## 2021-01-19 DIAGNOSIS — G11.4 HEREDITARY SPASTIC PARAPLEGIA (HCC): ICD-10-CM

## 2021-01-19 RX ORDER — ONABOTULINUMTOXINA 100 [USP'U]/1
300 INJECTION, POWDER, LYOPHILIZED, FOR SOLUTION INTRADERMAL; INTRAMUSCULAR
Qty: 300 UNITS | Refills: 3 | Status: SHIPPED | OUTPATIENT
Start: 2021-01-19 | End: 2021-01-01 | Stop reason: SDUPTHER

## 2021-01-19 NOTE — TELEPHONE ENCOUNTER
botox PA renewal sent to [facility]     Pending Key: [id number] - [id number]  Botox 100UNIT IJ SOLR    appt is 1/25    SPP is [place] or [facility]

## 2021-01-19 NOTE — TELEPHONE ENCOUNTER
Dr. Vaz ,     Can you write a new Botox Rx? It has one refill left, but pt thinks it has . To e-scribe to [facility] ID is [id number] at [address] [place] [address]. Please let me know once completed. His appt is Monday. Auth is complete and I have called the order in to set up delivery.

## 2021-01-19 NOTE — TELEPHONE ENCOUNTER
Botox approval from [employer]  12/20/20 - 1/19/22    Called pt on cell, left vm to call back to my number [phone]. Faxed auth letter to [facility] requested delivery by Thurs or fri this week as pt's appt is Monday 1/25. Also called him at [phone] and left details on pharmacy vm.

## 2021-01-20 ENCOUNTER — TELEPHONE (OUTPATIENT)
Dept: NEUROLOGY | Age: 61
End: 2021-01-20

## 2021-01-20 NOTE — TELEPHONE ENCOUNTER
[facility] [employer] ph [phone] to expedite shipment. Gave them all 3 ph numbers to reach his caregiver, Patsy Callejas. They had a global consent to ship last year and to draft copay. However it has increased from 145 to 185 so they will need to get her permission for this higher copay this year.

## 2021-01-21 ENCOUNTER — TELEPHONE (OUTPATIENT)
Dept: NEUROLOGY | Age: 61
End: 2021-01-21

## 2021-01-21 NOTE — TELEPHONE ENCOUNTER
Harmony Brady[facility]  [phone]    Is shipping tonight for tomorrow's delivery. Verified (3) 100 units. Of Note: There are (3) 100 unit vials in the Botox refrigerator w/ an expiration of October 2021. These should be used FIRST.

## 2021-01-22 NOTE — PROGRESS NOTES
Neurology Note    Patient ID:  Abby Noland  [id number]  76 y.o.  1960      Date of Consultation:  January 25, 2021      Subjective: I am here for botox       History of Present Illness:   Abby Noland is a 61 y.o. male who returns to the neurology clinic at [facility] for his botulinum toxin injections. The patient was last seen in clinic on October 19, 2020. Aki Gong Please see my history of present illness, examination, and treatment base plan from that day. 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 Since he was last here, feel that the botulinum toxin helped him considerably. It has been only over the last 4 days that he has noticed increasing spasticity in the right leg. He does feel that his right hand is getting slightly weaker. He has not noticed any symptoms on his left as much as he had previously. He would like to receive the botulinum toxin injections today. Past Medical History:   Diagnosis Date    ALS (amyotrophic lateral sclerosis) ([facility])     right sided weakness    CP (cerebral palsy) ([facility])     cerebellar implants        Past Surgical History:   Procedure Laterality Date    HX HEENT      total teeth extraction/implants    HX ORTHOPAEDIC  2015    repair of fractured right hip    HX OTHER SURGICAL      cellebellar implants        Family History   Problem Relation Age of Onset    Heart Disease Mother     Dementia Father     Hypertension Sister     Hypertension Brother     Cancer Brother         lung    Hypertension Sister     Anesth Problems Neg Hx         Social History     Tobacco Use    Smoking status: Never Smoker    Smokeless tobacco: Never Used   Substance Use Topics    Alcohol use:  Yes     Alcohol/week: 2.0 standard drinks     Types: 2 Shots of liquor per week        No Known Allergies     Prior to Admission medications Medication Sig Start Date End Date Taking? Authorizing Provider   onabotulinumtoxinA (Botox) 100 unit injection 300 Units by IntraMUSCular route every three (3) months. emg guided  Indications: right lower extremity. 1/19/21  Yes Valentino Calderon, DO   OTHER Motorized powerwheelchair 6/19/20  Yes Valentino Calderon, DO   ibuprofen (MOTRIN) 400 mg tablet Take  by mouth two (2) times a day. Yes Provider, Historical   loperamide (IMMODIUM) 2 mg tablet Take 2 mg by mouth two (2) times a day. Yes Provider, Historical   OTHER Physical therapy evaluation for power wheel chair. 4/6/20  Yes Valentino Calderon,    diazepam (VALIUM) 5 mg tablet Take 5 mg by mouth nightly. Yes Provider, Historical   oxyCODONE-acetaminophen (PERCOCET) 5-325 mg per tablet Take 1 Tab by mouth nightly. Provider, Historical   naproxen sodium 220 mg cap Take 2 Tabs by mouth two (2) times a day.     Provider, Historical       Review of Systems:    General, constitutional: negative  Eyes, vision: negative  Ears, nose, throat: negative  Cardiovascular, heart: negative  Respiratory: negative  Gastrointestinal: negative  Genitourinary: negative  Musculoskeletal: Muscle pain and cramping  Skin and integumentary: negative  Psychiatric: negative  Endocrine: negative  Neurological: negative, except for HPI  Hematologic/lymphatic: negative  Allergy/immunology: negative      Objective:     Visit Vitals  /77   Pulse 61   Ht 5' 7\" (1.702 m)   Wt 139 lb (63 kg)   SpO2 97%   BMI 21.77 kg/m²       Physical Exam:    General:  appears well nourished in no acute distress  Neck: no carotid bruits  Lungs: clear to auscultation  Heart:  no murmurs, regular rate  Lower extremity: peripheral pulses palpable and no edema  Skin: intact    Neurological exam:    Awake, alert, oriented to person, place and time  Recent and remote memory were normal  Speech is dysarthric which is his baseline    Cranial nerves:   II-XII were tested    Perrrla  Eomi, no evidence of nystagmus  Facial sensation:  normal and symmetric  Facial motor: normal and symmetric  Hearing intact  SCM strength intact  Tongue: midline without fasciculations    Motor:   Increased tone throughout  Trace fasciculations in his left lower extremity:    He does have mild diffuse weakness which is worse in his right upper and lower extremity. He has notable spasticity throughout and a greater degree of  weakness in his right hamstrings. Sensory:  Intact to pinprick throughout    Reflexes:  Diffuse hyperreflexia      Gait: Not assessed due to his spasticity and weakness    Labs:     No results found for: HBA1C, NA, K, CL, GLU, BUN, CREA, CA, WBC, HCT, HGB, PLT, LDL, [id number], HCTEXT, HGBEXT, PLTEXT, [id number], HCTEXT, HGBEXT, PLTEXT    Imaging:    No results found for this or any previous visit. No results found for this or any previous visit. 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 I plan to injection   300   units for the diagnosis of right lower extremity upper motor neuron spasticity    Consent performed and placed in medical records    Timeout was performed    All injections were performed under emg guidance unless otherwise indicated    Each vial of botulinum toxin was reconstituted with 2 cc of normal saline    Lot number: c3. X 2, [id number] x 1    Expiration date:4/23, 6/23    Procedure:    EMG guidance was used for all injections unless otherwise noted.      Right side:     Muscles Number of units Number of sites Total units injected   Biceps femoris LH 75 1 75   Biceps femoris SH 75 1 75   Semimembranosis 75 1 75   Semitendinosis 75 1 75           Amount of botulinum toxin injected: 300    Amount of wastage:0    Total amount of botulinum toxin:300 units    There was no immediate complications. Blood loss was minimal    The patient was told to call the office or go to the nearest emergency room if side effects arise. The patient will return in 3 months for re-evaluation and consideration of future injections    Coronavirus pandemic:  I did discuss with the patient at length the importance of social distancing and proper hygiene especially during these times. The patient is at a higher risk of having a more severe course of the disease and needs to follow all CDC recommendations. The patient acknowledges. I recommended that he receive the coronavirus vaccine when it is available for him.              Signed By:  Opal Gomez DO FAAN    January 25, 2021

## 2021-01-25 ENCOUNTER — DOCUMENTATION ONLY (OUTPATIENT)
Dept: NEUROLOGY | Age: 61
End: 2021-01-25

## 2021-01-25 ENCOUNTER — OFFICE VISIT (OUTPATIENT)
Dept: NEUROLOGY | Age: 61
End: 2021-01-25
Payer: COMMERCIAL

## 2021-01-25 VITALS
SYSTOLIC BLOOD PRESSURE: 124 MMHG | OXYGEN SATURATION: 97 % | DIASTOLIC BLOOD PRESSURE: 77 MMHG | HEIGHT: 67 IN | WEIGHT: 139 LBS | BODY MASS INDEX: 21.82 KG/M2 | HEART RATE: 61 BPM

## 2021-01-25 DIAGNOSIS — G12.23 PRIMARY LATERAL SCLEROSIS (HCC): ICD-10-CM

## 2021-01-25 DIAGNOSIS — G80.2 SPASTIC HEMIPLEGIC CEREBRAL PALSY (HCC): Primary | ICD-10-CM

## 2021-01-25 DIAGNOSIS — G12.29 UPPER MOTOR NEURON DISEASE (HCC): ICD-10-CM

## 2021-01-25 PROCEDURE — 95874 GUIDE NERV DESTR NEEDLE EMG: CPT | Performed by: PSYCHIATRY & NEUROLOGY

## 2021-01-25 PROCEDURE — 64642 CHEMODENERV 1 EXTREMITY 1-4: CPT | Performed by: PSYCHIATRY & NEUROLOGY

## 2021-01-25 NOTE — PROGRESS NOTES
botox came in the office: 1/25/2021  MFR: allergbrandon  LOT: [id number]  Exp: 10/2023  Appointment:  Specialty pharmacy:[facility]  Provider: Raissa Licea

## 2021-04-30 ENCOUNTER — OFFICE VISIT (OUTPATIENT)
Dept: NEUROLOGY | Age: 61
End: 2021-04-30
Payer: COMMERCIAL

## 2021-04-30 DIAGNOSIS — G80.2 SPASTIC HEMIPLEGIC CEREBRAL PALSY (HCC): ICD-10-CM

## 2021-04-30 DIAGNOSIS — G81.10 SPASTIC HEMIPLEGIA AFFECTING DOMINANT SIDE (HCC): ICD-10-CM

## 2021-04-30 PROCEDURE — 64646 CHEMODENERV TRUNK MUSC 1-5: CPT | Performed by: PSYCHIATRY & NEUROLOGY

## 2021-04-30 PROCEDURE — 95874 GUIDE NERV DESTR NEEDLE EMG: CPT | Performed by: PSYCHIATRY & NEUROLOGY

## 2021-04-30 NOTE — PROGRESS NOTES
Neurology Note    Patient ID:  Palmer Sacks  [id number]  15 y.o.  1960      Date of Consultation:  April 30, 2021      Subjective: I am here for botox       History of Present Illness:   Palmer Sacks is a 64 y.o. male who returns to the neurology clinic at [facility] for his botulinum toxin injections. The patient was last seen in clinic January 25, 2021. Please see my history of present illness, examination, and treatment base plan from that day. 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 Since he was last here, feel that the botulinum toxin helped him considerably. He noticed improvement in his leg spasms and pain within a few days of the injection. It is only been over the last couple days that the medicine has worn off. His sister can notice when the medicine has worn off as he calls out at night need to be repositioned due to the spasms. He would like to receive the botulinum toxin injections today. Past Medical History:   Diagnosis Date    ALS [facility]amyotrophic lateral sclerosis) ([facility])     right sided weakness    CP (cerebral palsy) ([facility])     cerebellar implants        Past Surgical History:   Procedure Laterality Date    HX HEENT      total teeth extraction/implants    HX ORTHOPAEDIC  2015    repair of fractured right hip    HX OTHER SURGICAL      cellebellar implants        Family History   Problem Relation Age of Onset    Heart Disease Mother     Dementia Father     Hypertension Sister     Hypertension Brother     Cancer Brother         lung    Hypertension Sister     Anesth Problems Neg Hx         Social History     Tobacco Use    Smoking status: Never Smoker    Smokeless tobacco: Never Used   Substance Use Topics    Alcohol use:  Yes     Alcohol/week: 2.0 standard drinks     Types: 2 Shots of liquor per week        No Known Allergies Prior to Admission medications    Medication Sig Start Date End Date Taking? Authorizing Provider   onabotulinumtoxinA (Botox) 100 unit injection 300 Units by IntraMUSCular route every three (3) months. emg guided  Indications: right lower extremity. 1/19/21  Yes Valentino Calderon, DO   OTHER Motorized powerwheelchair 6/19/20  Yes Valentino Calderon,    ibuprofen (MOTRIN) 400 mg tablet Take  by mouth two (2) times a day. Yes Provider, Historical   loperamide (IMMODIUM) 2 mg tablet Take 2 mg by mouth two (2) times a day. Yes Provider, Historical   OTHER Physical therapy evaluation for power wheel chair. 4/6/20  Yes Valentino Calderon,    diazepam (VALIUM) 5 mg tablet Take 5 mg by mouth nightly. Yes Provider, Historical       Review of Systems:    General, constitutional: negative  Eyes, vision: negative  Ears, nose, throat: negative  Cardiovascular, heart: negative  Respiratory: negative  Gastrointestinal: negative  Genitourinary: negative  Musculoskeletal: Muscle pain and cramping  Skin and integumentary: negative  Psychiatric: negative  Endocrine: negative  Neurological: negative, except for HPI  Hematologic/lymphatic: negative  Allergy/immunology: negative      Objective: There were no vitals taken for this visit.     Physical Exam:    General:  appears well nourished in no acute distress  Neck: no carotid bruits  Lungs: clear to auscultation  Heart:  no murmurs, regular rate  Lower extremity: peripheral pulses palpable and no edema  Skin: intact    Neurological exam:    Awake, alert, oriented to person, place and time  Recent and remote memory were normal  Speech is dysarthric which is his baseline    Cranial nerves:   II-XII were tested    Perrrla  Eomi, no evidence of nystagmus  Facial sensation:  normal and symmetric  Facial motor: normal and symmetric  Hearing intact  SCM strength intact  Tongue: midline without fasciculations    Motor:   Increased tone throughout  Trace fasciculations in his left lower extremity:    He does have mild diffuse weakness which is worse in his right upper and lower extremity. He has notable spasticity throughout and a greater degree of  weakness in his right hamstrings. Sensory:  Intact to pinprick throughout    Reflexes:  Diffuse hyperreflexia      Gait: Not assessed due to his spasticity and weakness    Labs:     No results found for: HBA1C, NA, K, CL, GLU, BUN, CREA, CA, WBC, HCT, HGB, PLT, LDL, [id number], HCTEXT, HGBEXT, PLTEXT, [id number], HCTEXT, HGBEXT, PLTEXT    Imaging:    No results found for this or any previous visit. No results found for this or any previous visit. 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 I plan to injection   300   units for the diagnosis of right lower extremity upper motor neuron spasticity    Consent performed and placed in medical records    Timeout was performed    All injections were performed under emg guidance unless otherwise indicated    Each vial of botulinum toxin was reconstituted with 2 cc of normal saline    Lot number: [id number]. X 3,    Expiration date:10/23    Procedure:    EMG guidance was used for all injections unless otherwise noted. Right side:     Muscles Number of units Number of sites Total units injected   Biceps femoris LH 75 1 75   Biceps femoris SH 75 1 75   Semimembranosis 75 1 75   Semitendinosis 75 1 75           Amount of botulinum toxin injected: 300    Amount of wastage:0    Total amount of botulinum toxin:300 units    There was no immediate complications. Blood loss was minimal    The patient was told to call the office or go to the nearest emergency room if side effects arise.     The patient will return in 3 months for re-evaluation and consideration of future injections    Coronavirus pandemic:  He received his vaccine             Signed By:  Carlene Jackson DO FAAN    April 30, 2021

## 2021-06-14 ENCOUNTER — TELEPHONE (OUTPATIENT)
Dept: NEUROLOGY | Age: 61
End: 2021-06-14

## 2021-06-16 ENCOUNTER — TELEPHONE (OUTPATIENT)
Dept: NEUROLOGY | Age: 61
End: 2021-06-16

## 2021-06-16 NOTE — TELEPHONE ENCOUNTER
Botox delivered on 6/16  3 100unit/vial vials  Total of 300units  SP: Caitlyn  MANU: Allergen  LOT: [id number]  EXP: 10/23  APPT: 6/21

## 2021-06-18 NOTE — PROGRESS NOTES
Neurology Note    Patient ID:  Remy Hoyos  [id number]  89 y.o.  1960      Date of Consultation:  June 21, 2021      Subjective: I am here for botox       History of Present Illness:   Remy Hoyos is a 64 y.o. male who returns to the neurology clinic at [facility] for his botulinum toxin injections. The patient was last seen in clinic 4/30/2021. please see my history of present illness, examination, and treatment base plan from that day. 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 Since he was last here, feel that the botulinum toxin helped him considerably. He noticed improvement in his leg spasms and pain within a few days of the injection. Due to timing of his injections and availability, he is a couple of weeks early for this injection. He has not noticed any wearing off of the medication. He does get occasional bilateral whole leg spasms when he has a bowel movement. He does take a valium which helps. His sister is looking for additional support at the house. He would like to receive the botulinum toxin injections today.     Past Medical History:   Diagnosis Date    ALS (amyotrophic lateral sclerosis) ([facility])     right sided weakness    CP (cerebral palsy) ([facility])     cerebellar implants        Past Surgical History:   Procedure Laterality Date    HX HEENT      total teeth extraction/implants    HX ORTHOPAEDIC  2015    repair of fractured right hip    HX OTHER SURGICAL      cellebellar implants        Family History   Problem Relation Age of Onset    Heart Disease Mother     Dementia Father     Hypertension Sister     Hypertension Brother     Cancer Brother         lung    Hypertension Sister     Anesth Problems Neg Hx         Social History     Tobacco Use    Smoking status: Never Smoker    Smokeless tobacco: Never Used   Substance Use Topics    Alcohol use: Yes     Alcohol/week: 2.0 standard drinks     Types: 2 Shots of liquor per week        No Known Allergies     Prior to Admission medications    Medication Sig Start Date End Date Taking? Authorizing Provider   onabotulinumtoxinA (Botox) 100 unit injection 300 Units by IntraMUSCular route every three (3) months. emg guided  Indications: right lower extremity. 1/19/21  Yes Valentino Calderon,    OTHER Motorized powerwheelchair 6/19/20  Yes Valentino Calderon,    ibuprofen (MOTRIN) 400 mg tablet Take  by mouth two (2) times a day. Yes Provider, Historical   loperamide (IMMODIUM) 2 mg tablet Take 2 mg by mouth two (2) times a day. Yes Provider, Historical   OTHER Physical therapy evaluation for power wheel chair. 4/6/20  Yes Valentino Calderon DO   diazepam (VALIUM) 5 mg tablet Take 5 mg by mouth nightly. Yes Provider, Historical       Review of Systems:    General, constitutional: negative  Eyes, vision: negative  Ears, nose, throat: negative  Cardiovascular, heart: negative  Respiratory: negative  Gastrointestinal: negative  Genitourinary: negative  Musculoskeletal: Muscle pain and cramping  Skin and integumentary: negative  Psychiatric: negative  Endocrine: negative  Neurological: negative, except for HPI  Hematologic/lymphatic: negative  Allergy/immunology: negative      Objective: There were no vitals taken for this visit.     Physical Exam:    General:  appears well nourished in no acute distress  Neck: no carotid bruits  Lungs: clear to auscultation  Heart:  no murmurs, regular rate  Lower extremity: peripheral pulses palpable and no edema  Skin: intact    Neurological exam:    Awake, alert, oriented to person, place and time  Recent and remote memory were normal  Speech is dysarthric which is his baseline    Cranial nerves:   II-XII were tested    Perrrla  Eomi, no evidence of nystagmus  Facial sensation:  normal and symmetric  Facial motor: normal and symmetric  Hearing intact  SCM strength intact  Tongue: midline without fasciculations    Motor:   Increased tone throughout  Trace fasciculations in his left lower extremity:    He does have mild diffuse weakness which is worse in his right upper and lower extremity. He has notable spasticity throughout and a greater degree of  weakness in his right hamstrings. Sensory:  Intact to pinprick throughout    Reflexes:  Diffuse hyperreflexia      Gait: Not assessed due to his spasticity and weakness    Labs:     No results found for: HBA1C, NA, K, CL, GLU, BUN, CREA, CA, WBC, HCT, HGB, PLT, LDL, [id number], HCTEXT, HGBEXT, PLTEXT, [id number], HCTEXT, HGBEXT, PLTEXT    Imaging:    No results found for this or any previous visit. No results found for this or any previous visit. 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 I plan to injection   300   units for the diagnosis of right lower extremity upper motor neuron spasticity    Consent performed and placed in medical records    Timeout was performed    All injections were performed under emg guidance unless otherwise indicated    Each vial of botulinum toxin was reconstituted with 2 cc of normal saline    Lot number: [id number] X 3,    Expiration date:10/23    Procedure:    EMG guidance was used for all injections unless otherwise noted. Right side:     Muscles Number of units Number of sites Total units injected   Biceps femoris LH 75 1 75   Biceps femoris SH 75 1 75   Semimembranosis 75 1 75   Semitendinosis 75 1 75           Amount of botulinum toxin injected: 300    Amount of wastage:0    Total amount of botulinum toxin:300 units    There was no immediate complications.     Blood loss was minimal    The patient was told to call the office or go to the nearest emergency room if side effects arise.     The patient will return in 3 months for re-evaluation and consideration of future injections    Coronavirus pandemic:  He received his vaccine    I did provide his sister with resources to help prevent caregiver burden and provide additional resources for Gayotilia Bryan             Signed By:  Mark Hancock DO FAAN    June 21, 2021

## 2021-06-21 ENCOUNTER — OFFICE VISIT (OUTPATIENT)
Dept: NEUROLOGY | Age: 61
End: 2021-06-21
Payer: COMMERCIAL

## 2021-06-21 DIAGNOSIS — G81.10 SPASTIC HEMIPLEGIA AFFECTING DOMINANT SIDE (HCC): Primary | ICD-10-CM

## 2021-06-21 PROCEDURE — 64642 CHEMODENERV 1 EXTREMITY 1-4: CPT | Performed by: PSYCHIATRY & NEUROLOGY

## 2021-06-21 PROCEDURE — 95874 GUIDE NERV DESTR NEEDLE EMG: CPT | Performed by: PSYCHIATRY & NEUROLOGY

## 2021-09-07 NOTE — TELEPHONE ENCOUNTER
----- Message from Gery Rinne sent at 12/1/2020 12:09 PM EST -----  Regarding: Dr Cliff Jacobo first and last name: Dirk Lemus sister      Reason for call:Ms. Meredith Cisse sent two [employer] messages regarding confirming the verification for Botox with [address] [place] [address], but she hasn't heard back yet. Please contact Ms. Meredith Cisse through [address] or phone after verifying the Botox prior approval.            Callback required yes/no and why:yes      Best contact number(s):[phone]      Details to clarify the request:phone number to call for verification approval is [phone], for Md to call for pre approval   his [employer] # [id number], please call back to confirm so have for appt in Jan Gery Rinne
Returned patients mother call advised her per Kaylin Swanson his Botox should be fine for his next shipment she verbalized understanding.
CT chest no contrast9/5/2021IMPRESSION:  Decreased left hydropneumothorax.  Consolidation and atelectasis left lower lobe similar to 08/31/2021.  Scattered new patchy groundglass opacities right upper and lower lobes, likely infectious.  Dilated esophagus and stomach. Consider intermittent aspiration.

## 2021-09-10 NOTE — TELEPHONE ENCOUNTER
Called [facility]; scheduled botox delivery for . Their card on file for copay is . Needs to contact patient's sister to get new card set up. Called patient's sister and gave [employer] phone number to handle the card situation.

## 2021-09-13 NOTE — PROGRESS NOTES
Neurology Note    Patient ID:  Sarmad Collazo  [id number]  89 y.o.  1960      Date of Consultation:  September 14, 2021      Subjective: I am here for botox       History of Present Illness:   Sarmad Collazo is a 64 y.o. male who returns to the neurology clinic at [facility] for his botulinum toxin injections. The patient was last seen in clinic 6/21/2021. please see my history of present illness, examination, and treatment plan from that day. 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 Since he was last here, feel that the botulinum toxin helped him considerably. He noticed improvement in his leg spasms and pain within a few days of the injection. He has noticed increasing spasm and pain just over the last week. His sister states that only over the last week she been woken up at night to help with stretching due to spasms that he is having. For the 2 months and 3 weeks preceding that she was not woken up once. His sister has gotten additional support at the house which has been helpful for her. He would like to receive the botulinum toxin injections today.     Past Medical History:   Diagnosis Date    ALS (amyotrophic lateral sclerosis) ([facility])     right sided weakness    CP (cerebral palsy) ([facility])     cerebellar implants        Past Surgical History:   Procedure Laterality Date    HX HEENT      total teeth extraction/implants    HX ORTHOPAEDIC  2015    repair of fractured right hip    HX OTHER SURGICAL      cellebellar implants        Family History   Problem Relation Age of Onset    Heart Disease Mother     Dementia Father     Hypertension Sister     Hypertension Brother     Cancer Brother         lung    Hypertension Sister     Anesth Problems Neg Hx         Social History     Tobacco Use    Smoking status: Never Smoker    Smokeless tobacco: Never Used   Substance Use Topics    Alcohol use: Yes     Alcohol/week: 2.0 standard drinks     Types: 2 Shots of liquor per week        No Known Allergies     Prior to Admission medications    Medication Sig Start Date End Date Taking? Authorizing Provider   onabotulinumtoxinA (Botox) 100 unit injection 300 Units by IntraMUSCular route every three (3) months. emg guided. Right lower leg spaticity  Indications: right lower extremity. 9/9/21   Valentino Calderon DO   OTHER Motorized powerwheelchair 6/19/20   Valentino Calderon,    ibuprofen (MOTRIN) 400 mg tablet Take  by mouth two (2) times a day. Provider, Historical   loperamide (IMMODIUM) 2 mg tablet Take 2 mg by mouth two (2) times a day. Provider, Historical   OTHER Physical therapy evaluation for power wheel chair. 4/6/20   Valentino Calderon DO   diazepam (VALIUM) 5 mg tablet Take 5 mg by mouth nightly. Provider, Historical       Review of Systems:    General, constitutional: negative  Eyes, vision: negative  Ears, nose, throat: negative  Cardiovascular, heart: negative  Respiratory: negative  Gastrointestinal: negative  Genitourinary: negative  Musculoskeletal: Muscle pain and cramping  Skin and integumentary: negative  Psychiatric: negative  Endocrine: negative  Neurological: negative, except for HPI  Hematologic/lymphatic: negative  Allergy/immunology: negative      Objective: There were no vitals taken for this visit.     Physical Exam:    General:  appears well nourished in no acute distress  Neck: no carotid bruits  Lungs: clear to auscultation  Heart:  no murmurs, regular rate  Lower extremity: peripheral pulses palpable and no edema  Skin: intact    Neurological exam:    Awake, alert, oriented to person, place and time  Recent and remote memory were normal  Speech is dysarthric which is his baseline    Cranial nerves:   II-XII were tested    Perrrla  Eomi, no evidence of nystagmus  Facial sensation:  normal and symmetric  Facial motor: normal and symmetric  Hearing intact  SCM strength intact  Tongue: midline without fasciculations    Motor:   Increased tone throughout  Trace fasciculations in his left lower extremity:    He does have mild diffuse weakness which is worse in his right upper and lower extremity. He has notable spasticity throughout and a greater degree of  weakness in his right hamstrings. Sensory:  Intact to pinprick throughout    Reflexes:  Diffuse hyperreflexia      Gait: Not assessed due to his spasticity and weakness    Labs:     No results found for: HBA1C, NA, K, CL, GLU, BUN, CREA, CA, WBC, HCT, HGB, PLT, LDL, OBT9INGU, HCTEXT, HGBEXT, PLTEXT, [id number], HCTEXT, HGBEXT, PLTEXT    Imaging:    No results found for this or any previous visit. No results found for this or any previous visit. 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 I plan to injection   300   units for the diagnosis of right lower extremity upper motor neuron spasticity    Consent performed and placed in medical records    Timeout was performed    All injections were performed under emg guidance unless otherwise indicated    Each vial of botulinum toxin was reconstituted with 2 cc of normal saline    Lot number: [id number] X [id number],    Expiration date:11/23    Procedure:    EMG guidance was used for all injections unless otherwise noted. Right side:     Muscles Number of units Number of sites Total units injected   Biceps femoris LH 75 1 75   Biceps femoris SH 75 1 75   Semimembranosis 75 1 75   Semitendinosis 75 1 75           Amount of botulinum toxin injected: 300    Amount of wastage:0    Total amount of botulinum toxin:300 units    There was no immediate complications.     Blood loss was minimal    The patient was told to call the office or go to the nearest emergency room if side effects arise.     The patient will return in 3 months for re-evaluation and consideration of future injections             Signed By:  Don Luna DO FAAN    September 14, 2021

## 2021-09-14 NOTE — TELEPHONE ENCOUNTER
botox arrived 9/14  Three vials of 100units  MANU: ALLERGEN  SP: CONOR  LOT: [id number]  EXP: 11/23  APPT: 9/14

## 2021-09-14 NOTE — LETTER
9/14/2021    Patient: Edison Thorpe   YOB: 1960   Date of Visit: 9/14/2021     Josy Nation MD  [address] [place] [id number]  Via Fax: [phone]    Dear Josy Nation MD,      Thank you for referring Mr. Kimani Pisano to [address] [place] [address] for evaluation. My notes for this consultation are attached. If you have questions, please do not hesitate to call me. I look forward to following your patient along with you.       Sincerely,    Valentino Calderon, DO

## 2021-12-09 NOTE — TELEPHONE ENCOUNTER
Botox arrived 12/9/21  3 vials of 100 units  MANU: ALLERGEN  SP: WOLFGANG  LOT: [id number]  EXP:02/24  JF:[id number]  APPT: 12/10/21

## 2021-12-09 NOTE — PROGRESS NOTES
Neurology Note    Patient ID:  Shadi Chávez  [id number]  49 y.o.  1960      Date of Consultation:  December 10, 2021      Subjective: I am here for botox       History of Present Illness:   Shadi Chávez is a 64 y.o. male who returns to the neurology clinic at [facility] for his botulinum toxin injections. The patient was last seen in clinic September 14, 2021. . please see my history of present illness, examination, and treatment plan from that day. 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 Since he was last here, feel that the botulinum toxin helped him considerably. He noticed improvement in his leg spasms and pain within a few days of the injection. He has noticed increasing spasm and pain just over the days. The patient's sister stated that just this week with spasms. The only new symptom that he has noticed is occasionally he does get spasms in his leg associated with acute urinary retention. He discussed this with his primary care doctor and I discussed taking an additional Valium at nighttime but they are hesitant on more medication. I did discuss with him considering to decrease fluid intake at night. He would like to receive the botulinum toxin injections today.     Past Medical History:   Diagnosis Date    [facility]amyotrophic lateral [facility]) ([facility])     right sided weakness    CP (cerebral palsy) ([facility])     cerebellar implants        Past Surgical History:   Procedure Laterality Date    HX HEENT      total teeth extraction/implants    HX ORTHOPAEDIC  2015    repair of fractured right hip    HX OTHER SURGICAL      cellebellar implants        Family History   Problem Relation Age of Onset    Heart Disease Mother     Dementia Father     Hypertension Sister     Hypertension Brother     Cancer Brother         lung    Hypertension Sister    [facility] Anesth Problems Neg Hx         Social History     Tobacco Use    Smoking status: Never Smoker    Smokeless tobacco: Never Used   Substance Use Topics    Alcohol use: Yes     Alcohol/week: 2.0 standard drinks     Types: 2 Shots of liquor per week        No Known Allergies     Prior to Admission medications    Medication Sig Start Date End Date Taking? Authorizing Provider   onabotulinumtoxinA (Botox) 100 unit injection 300 Units by IntraMUSCular route every three (3) months. emg guided. Right lower leg spaticity  Indications: right lower extremity. 9/9/21   Valentino Calderon DO   OTHER Motorized powerwheelchair 6/19/20   Valentino Calderon,    ibuprofen (MOTRIN) 400 mg tablet Take  by mouth two (2) times a day. Provider, Historical   loperamide (IMMODIUM) 2 mg tablet Take 2 mg by mouth two (2) times a day. Provider, Historical   OTHER Physical therapy evaluation for power wheel chair. 4/6/20   Valentino Calderon DO   diazepam (VALIUM) 5 mg tablet Take 5 mg by mouth nightly. Provider, Historical       Review of Systems:    General, constitutional: negative  Eyes, vision: negative  Ears, nose, throat: negative  Cardiovascular, heart: negative  Respiratory: negative  Gastrointestinal: negative  Genitourinary: negative  Musculoskeletal: Muscle pain and cramping, worse in right shoulder  Skin and integumentary: negative  Psychiatric: negative  Endocrine: negative  Neurological: negative, except for HPI  Hematologic/lymphatic: negative  Allergy/immunology: negative      Objective: There were no vitals taken for this visit.     Physical Exam:    General:  appears well nourished in no acute distress  Neck: no carotid bruits  Lungs: clear to auscultation  Heart:  no murmurs, regular rate  Lower extremity: peripheral pulses palpable and no edema  Skin: intact    Neurological exam:    Awake, alert, oriented to person, place and time  Recent and remote memory were normal  Speech is dysarthric which is his baseline    Cranial nerves:   II-XII were tested    Perrrla  Eomi, no evidence of nystagmus  Facial sensation:  normal and symmetric  Facial motor: normal and symmetric  Hearing intact  SCM strength intact  Tongue: midline without fasciculations    Motor:   Increased tone throughout  Trace fasciculations in his left lower extremity:    He does have mild diffuse weakness which is worse in his right upper and lower extremity. He has notable spasticity throughout and a greater degree of  weakness in his right hamstrings. Sensory:  Intact to pinprick throughout    Reflexes:  Diffuse hyperreflexia      Gait: Not assessed due to his spasticity and weakness    Labs:     No results found for: HBA1C, NA, K, CL, GLU, BUN, CREA, CA, WBC, HCT, HGB, PLT, LDL, [id number], HCTEXT, HGBEXT, PLTEXT, GZK2PHRA, HCTEXT, HGBEXT, PLTEXT    Imaging:    No results found for this or any previous visit. No results found for this or any previous visit. 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 I plan to injection   300   units for the diagnosis of right lower extremity upper motor neuron spasticity    Consent performed and placed in medical records    Timeout was performed    All injections were performed under emg guidance unless otherwise indicated    Each vial of botulinum toxin was reconstituted with 2 cc of normal saline    Lot number: [id number] X 3,    Expiration date:2/24    Procedure:    EMG guidance was used for all injections unless otherwise noted.      Right side:     Muscles Number of units Number of sites Total units injected   Biceps femoris LH 75 1 75   Biceps femoris SH 75 1 75   Semimembranosis 75 1 75   Semitendinosis 75 1 75           Amount of botulinum toxin injected: 300    Amount of wastage:0    Total amount of botulinum toxin:300 units    There was no immediate complications. Blood loss was minimal    The patient was told to call the office or go to the nearest emergency room if side effects arise.     The patient will return in 3 months for re-evaluation and consideration of future injections             Signed By:  Shalonda Oneil DO FAAN    December 10, 2021

## 2021-12-10 NOTE — LETTER
12/10/2021    Patient: Quentin Weinberg   YOB: 1960   Date of Visit: 12/10/2021     Corin Suero MD  [address] [place] [phone]  Via Fax: [phone]    Dear Corin Suero MD,      Thank you for referring Mr. Bambi Aquino to [address] [place] [address] for evaluation. My notes for this consultation are attached. If you have questions, please do not hesitate to call me. I look forward to following your patient along with you.       Sincerely,    Valentino Calderon, DO

## 2022-01-01 ENCOUNTER — OFFICE VISIT (OUTPATIENT)
Dept: NEUROLOGY | Age: 62
End: 2022-01-01
Payer: COMMERCIAL

## 2022-01-01 ENCOUNTER — DOCUMENTATION ONLY (OUTPATIENT)
Dept: NEUROLOGY | Age: 62
End: 2022-01-01

## 2022-01-01 ENCOUNTER — TELEPHONE (OUTPATIENT)
Dept: NEUROLOGY | Age: 62
End: 2022-01-01

## 2022-01-01 VITALS — HEART RATE: 55 BPM | DIASTOLIC BLOOD PRESSURE: 77 MMHG | SYSTOLIC BLOOD PRESSURE: 124 MMHG | OXYGEN SATURATION: 97 %

## 2022-01-01 DIAGNOSIS — G80.2 SPASTIC HEMIPLEGIC CEREBRAL PALSY (HCC): Primary | ICD-10-CM

## 2022-01-01 DIAGNOSIS — G11.4 HEREDITARY SPASTIC PARAPLEGIA (HCC): ICD-10-CM

## 2022-01-01 DIAGNOSIS — G80.2 SPASTIC HEMIPLEGIC CEREBRAL PALSY (HCC): ICD-10-CM

## 2022-01-01 DIAGNOSIS — G12.29 UPPER MOTOR NEURON DISEASE (HCC): ICD-10-CM

## 2022-01-01 PROCEDURE — 64642 CHEMODENERV 1 EXTREMITY 1-4: CPT | Performed by: PSYCHIATRY & NEUROLOGY

## 2022-01-01 PROCEDURE — 95874 GUIDE NERV DESTR NEEDLE EMG: CPT | Performed by: PSYCHIATRY & NEUROLOGY

## 2022-01-01 RX ORDER — ONABOTULINUMTOXINA 100 [USP'U]/1
300 INJECTION, POWDER, LYOPHILIZED, FOR SOLUTION INTRADERMAL; INTRAMUSCULAR
Qty: 3 EACH | Refills: 2 | OUTPATIENT
Start: 2022-01-01

## 2022-03-15 NOTE — TELEPHONE ENCOUNTER
Botox     APPROVAL  300 UNITS     AUTH from  [facility]    2/13/22 - 3/15/23    Faxed auth letter to [facility]. Forwarded to [facility] to f/u/call in Rx to them. I marked it to need by 3/17 for 3/18 appt. CPT no PA's required.

## 2022-03-15 NOTE — TELEPHONE ENCOUNTER
I called Ian with [facility] and called in the script from 9/9/2021 so this has 2 refills. He will contact the patient to get permission to ship and will hopefully get this to the office by Thursday.     Please sign off on to reflect the new pharmacy

## 2022-03-16 NOTE — PROGRESS NOTES
Received botox from [address] [place] [address] for 3/18/2022 3-100 units   allergan  Lot number [id number]  Expiration date: 05/31/2024  [facility] number: [id number]

## 2022-03-17 NOTE — PROGRESS NOTES
Neurology Note    Patient ID:  Susanne Gilford  [id number]  55 y.o.  1960      Date of Consultation:  March 17, 2022      Subjective: I am here for botox       History of Present Illness:   Susanne Gilford is a 64 y.o. male who returns to the neurology clinic at [facility] for his botulinum toxin injections. The patient was last seen in clinic December 10, 2021. please see my history of present illness, examination, and treatment plan from that day. 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 Since he was last here, he feels that the botulinum toxin helped him considerably. He noticed improvement in his leg spasms and pain within a few days of the injection. He has noticed increasing spasm and pain just over the days. The patient's sister stated that just last night the spasms were bad. He did have a bout of significant abdominal pain and urinary retention. His sister did straight cath him. He would like to receive the botulinum toxin injections today. Past Medical History:   Diagnosis Date    ALS (amyotrophic lateral sclerosis) ([facility])     right sided weakness    CP (cerebral palsy) ([facility])     cerebellar implants        Past Surgical History:   Procedure Laterality Date    HX HEENT      total teeth extraction/implants    HX ORTHOPAEDIC  2015    repair of fractured right hip    HX OTHER SURGICAL      cellebellar implants        Family History   Problem Relation Age of Onset    Heart Disease Mother     Dementia Father     Hypertension Sister     Hypertension Brother     Cancer Brother         lung    Hypertension Sister     Anesth Problems Neg Hx         Social History     Tobacco Use    Smoking status: Never Smoker    Smokeless tobacco: Never Used   Substance Use Topics    Alcohol use:  Yes     Alcohol/week: 2.0 standard drinks     Types: 2 Shots of liquor per week        No Known Allergies     Prior to Admission medications    Medication Sig Start Date End Date Taking? Authorizing Provider   onabotulinumtoxinA (Botox) 100 unit injection 300 Units by IntraMUSCular route every three (3) months. emg guided. Right lower leg spaticity  Indications: right lower extremity. 3/15/22   Valentino Calderon DO   OTHER Motorized powerwheelchair 6/19/20   Valentino Calderon,    ibuprofen (MOTRIN) 400 mg tablet Take  by mouth two (2) times a day. Provider, Historical   loperamide (IMMODIUM) 2 mg tablet Take 2 mg by mouth two (2) times a day. Provider, Historical   OTHER Physical therapy evaluation for power wheel chair. 4/6/20   Valentino Calderon DO   diazepam (VALIUM) 5 mg tablet Take 5 mg by mouth nightly. Provider, Historical       Review of Systems:    General, constitutional: negative  Eyes, vision: negative  Ears, nose, throat: negative  Cardiovascular, heart: negative  Respiratory: negative  Gastrointestinal: negative  Genitourinary: negative  Musculoskeletal: Muscle pain and cramping  Skin and integumentary: negative  Psychiatric: negative  Endocrine: negative  Neurological: negative, except for HPI  Hematologic/lymphatic: negative  Allergy/immunology: negative      Objective: There were no vitals taken for this visit.     Physical Exam:    General:  appears well nourished in no acute distress  Neck: no carotid bruits  Lungs: clear to auscultation  Heart:  no murmurs, regular rate  Lower extremity: peripheral pulses palpable and no edema  Skin: intact    Neurological exam:    Awake, alert, oriented to person, place and time  Recent and remote memory were normal  Speech is dysarthric which is his baseline    Cranial nerves:   II-XII were tested    Perrrla  Eomi, no evidence of nystagmus  Facial sensation:  normal and symmetric  Facial motor: normal and symmetric  Hearing intact  SCM strength intact  Tongue: midline without fasciculations    Motor:   Increased tone throughout  Trace fasciculations in his left lower extremity:    He does have mild diffuse weakness which is worse in his right upper and lower extremity. He has notable spasticity throughout and a greater degree of  weakness in his right hamstrings. Sensory:  Intact to pinprick throughout    Reflexes:  Diffuse hyperreflexia      Gait: Not assessed due to his spasticity and weakness    Labs:     No results found for: HBA1C, NA, K, CL, GLU, BUN, CREA, CA, WBC, HCT, HGB, PLT, LDL, [id number], HCTEXT, HGBEXT, PLTEXT, [id number], HCTEXT, HGBEXT, PLTEXT    Imaging:    No results found for this or any previous visit. No results found for this or any previous visit. 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 I plan to injection   300   units for the diagnosis of right lower extremity upper motor neuron spasticity    Consent performed and placed in medical records    Timeout was performed    All injections were performed under emg guidance unless otherwise indicated    Each vial of botulinum toxin was reconstituted with 2 cc of normal saline    Lot number:[id number] C4 X 3,    Expiration date:5/24 x 3    Procedure:    EMG guidance was used for all injections unless otherwise noted. Right side:     Muscles Number of units Number of sites Total units injected   Biceps femoris LH 75 1 75   Biceps femoris SH 75 1 75   Semimembranosis 75 1 75   Semitendinosis 75 1 75           Amount of botulinum toxin injected: 300    Amount of wastage:0    Total amount of botulinum toxin:300 units    There was no immediate complications. Blood loss was minimal    The patient was told to call the office or go to the nearest emergency room if side effects arise.     The patient will return in 3 months for re-evaluation and consideration of future injections             Signed By:  Sky Romero DO FAAN    March 17, 2022

## 2022-06-07 NOTE — PROGRESS NOTES
Botox came in the office:   MFR: Eloina  LOT: [id number]  Exp: 9/2023  Appointment: 6/24/22  Provider: Tarsha Whitten 72.: [address] Phone Number: [phone]

## 2022-08-16 ENCOUNTER — TELEPHONE (OUTPATIENT)
Dept: NEUROLOGY | Age: 62
End: 2022-08-16

## 2022-08-16 NOTE — TELEPHONE ENCOUNTER
Re: Botox     Per Sarmad Lugo / Rylan Solis - pt's [employer] no longer active. I checked Availity and shows policy termed on [birth year].

## 2022-08-31 NOTE — TELEPHONE ENCOUNTER
Patient Botox to arrive on 10/16/20. The patient will need a new script come next botox injection. See telephone encounter 08/30/2022

## 2022-09-01 ENCOUNTER — TELEPHONE (OUTPATIENT)
Dept: NEUROLOGY | Age: 62
End: 2022-09-01

## 2022-09-01 NOTE — TELEPHONE ENCOUNTER
Per Latha/Jose Luis Cronin informed [facility] that patient passed away on 7/16/22.